# Patient Record
Sex: FEMALE | Race: WHITE | NOT HISPANIC OR LATINO | Employment: FULL TIME | URBAN - NONMETROPOLITAN AREA
[De-identification: names, ages, dates, MRNs, and addresses within clinical notes are randomized per-mention and may not be internally consistent; named-entity substitution may affect disease eponyms.]

---

## 2018-01-17 ENCOUNTER — TRANSCRIBE ORDERS (OUTPATIENT)
Dept: ADMINISTRATIVE | Facility: HOSPITAL | Age: 38
End: 2018-01-17

## 2018-01-17 DIAGNOSIS — R55 SYNCOPE AND COLLAPSE: Primary | ICD-10-CM

## 2018-01-23 ENCOUNTER — HOSPITAL ENCOUNTER (OUTPATIENT)
Dept: MRI IMAGING | Facility: HOSPITAL | Age: 38
Discharge: HOME | End: 2018-01-23
Admitting: NURSE PRACTITIONER

## 2018-01-23 ENCOUNTER — TRANSCRIBE ORDERS (OUTPATIENT)
Dept: ADMINISTRATIVE | Facility: HOSPITAL | Age: 38
End: 2018-01-23

## 2018-01-23 DIAGNOSIS — R55 SYNCOPE AND COLLAPSE: Primary | ICD-10-CM

## 2018-01-23 DIAGNOSIS — R55 SYNCOPE AND COLLAPSE: ICD-10-CM

## 2018-01-23 PROCEDURE — 70553 MRI BRAIN STEM W/O & W/DYE: CPT | Performed by: RADIOLOGY

## 2018-01-23 PROCEDURE — 0 GADOBENATE DIMEGLUMINE 529 MG/ML SOLUTION: Performed by: NURSE PRACTITIONER

## 2018-01-23 PROCEDURE — 70553 MRI BRAIN STEM W/O & W/DYE: CPT

## 2018-01-23 PROCEDURE — A9577 INJ MULTIHANCE: HCPCS | Performed by: NURSE PRACTITIONER

## 2018-01-23 RX ADMIN — GADOBENATE DIMEGLUMINE 12 ML: 529 INJECTION, SOLUTION INTRAVENOUS at 08:50

## 2023-04-11 ENCOUNTER — HOSPITAL ENCOUNTER (OUTPATIENT)
Dept: MAMMOGRAPHY | Facility: HOSPITAL | Age: 43
Discharge: HOME | End: 2023-04-11
Payer: COMMERCIAL

## 2023-04-11 ENCOUNTER — HOSPITAL ENCOUNTER (OUTPATIENT)
Dept: ULTRASOUND IMAGING | Facility: HOSPITAL | Age: 43
Discharge: HOME | End: 2023-04-11
Payer: COMMERCIAL

## 2023-04-11 DIAGNOSIS — R92.8 ABNORMAL MAMMOGRAM: ICD-10-CM

## 2023-04-11 PROCEDURE — 77065 DX MAMMO INCL CAD UNI: CPT

## 2023-04-11 PROCEDURE — 76642 ULTRASOUND BREAST LIMITED: CPT

## 2023-04-11 PROCEDURE — 77061 BREAST TOMOSYNTHESIS UNI: CPT | Performed by: RADIOLOGY

## 2023-04-11 PROCEDURE — G0279 TOMOSYNTHESIS, MAMMO: HCPCS

## 2023-04-11 PROCEDURE — 76642 ULTRASOUND BREAST LIMITED: CPT | Performed by: RADIOLOGY

## 2023-04-11 PROCEDURE — 77065 DX MAMMO INCL CAD UNI: CPT | Performed by: RADIOLOGY

## 2025-04-14 ENCOUNTER — OFFICE VISIT (OUTPATIENT)
Dept: ORTHOPEDIC SURGERY | Facility: CLINIC | Age: 45
End: 2025-04-14
Payer: COMMERCIAL

## 2025-04-14 VITALS
HEIGHT: 65 IN | SYSTOLIC BLOOD PRESSURE: 118 MMHG | BODY MASS INDEX: 24.26 KG/M2 | HEART RATE: 82 BPM | DIASTOLIC BLOOD PRESSURE: 72 MMHG | OXYGEN SATURATION: 96 % | WEIGHT: 145.6 LBS

## 2025-04-14 DIAGNOSIS — M79.642 BILATERAL HAND PAIN: Primary | ICD-10-CM

## 2025-04-14 DIAGNOSIS — G56.03 CARPAL TUNNEL SYNDROME ON BOTH SIDES: ICD-10-CM

## 2025-04-14 DIAGNOSIS — M79.10 MUSCLE PAIN: ICD-10-CM

## 2025-04-14 DIAGNOSIS — M54.2 NECK PAIN: ICD-10-CM

## 2025-04-14 DIAGNOSIS — M62.838 MUSCLE SPASM: ICD-10-CM

## 2025-04-14 DIAGNOSIS — R20.2 PARESTHESIA OF HAND, BILATERAL: ICD-10-CM

## 2025-04-14 DIAGNOSIS — M79.641 BILATERAL HAND PAIN: Primary | ICD-10-CM

## 2025-04-14 PROCEDURE — 1160F RVW MEDS BY RX/DR IN RCRD: CPT | Performed by: FAMILY MEDICINE

## 2025-04-14 PROCEDURE — 1159F MED LIST DOCD IN RCRD: CPT | Performed by: FAMILY MEDICINE

## 2025-04-14 PROCEDURE — 99203 OFFICE O/P NEW LOW 30 MIN: CPT | Performed by: FAMILY MEDICINE

## 2025-04-14 RX ORDER — LEVOCETIRIZINE DIHYDROCHLORIDE 5 MG/1
5 TABLET, FILM COATED ORAL DAILY
COMMUNITY
Start: 2025-03-26

## 2025-04-14 RX ORDER — TIZANIDINE 2 MG/1
2 TABLET ORAL
COMMUNITY
Start: 2025-03-26

## 2025-04-14 RX ORDER — PANTOPRAZOLE SODIUM 40 MG/1
40 TABLET, DELAYED RELEASE ORAL DAILY
COMMUNITY
Start: 2025-03-26

## 2025-04-14 RX ORDER — MONTELUKAST SODIUM 10 MG/1
10 TABLET ORAL DAILY
COMMUNITY
Start: 2025-03-26

## 2025-04-14 RX ORDER — ROPINIROLE 0.5 MG/1
0.5 TABLET, FILM COATED ORAL DAILY
COMMUNITY
Start: 2025-03-26

## 2025-04-14 RX ORDER — NAPROXEN 500 MG/1
500 TABLET ORAL
COMMUNITY
Start: 2025-03-26

## 2025-04-14 RX ORDER — BUSPIRONE HYDROCHLORIDE 7.5 MG/1
7.5 TABLET ORAL
COMMUNITY
Start: 2025-03-26

## 2025-04-14 NOTE — PROGRESS NOTES
New Patient Visit      Patient: Vidya Ryder  YOB: 1980  Date of Encounter: 04/14/2025  PCP: Дмитрий Pineda DO  Referring Provider: Дмитрий Pineda DO     Subjective   Vidya Ryder is a 44 y.o. female who presents to the office today for evaluation of Initial Evaluation, Pain, and Numbness of the Right Hand and Numbness, Pain, and Initial Evaluation of the Left Hand      Chief Complaint   Patient presents with    Right Hand - Initial Evaluation, Pain, Numbness    Left Hand - Numbness, Pain, Initial Evaluation       History of Present Illness  The patient presents for evaluation of bilateral hand pain.    She reports experiencing numbness and tingling in both hands, with a specific mention of discomfort at the base of her left thumb when pressure is applied. This condition has been persisting for several months and appears to be progressively worsening. She reports no injury or change in activity level that could have precipitated this condition. The symptoms are not localized to a specific part of the hand but rather originate from the shoulders and radiate down the arms. The pain is intermittent, with no identifiable triggers, and is often accompanied by numbness and tingling during sleep, mopping, walking, gripping objects, and driving. She occasionally wakes up with numbness in her hands. Her sleep position is typically on her sides. She also reports swelling in her hands. She is left-handed. She has not sought any treatment for this condition, except for taking naproxen during severe pain episodes, which provides some relief.    Additionally, she reports recent neck pain, which does not radiate down the arm but causes discomfort in the back of her neck. She does not experience any shoulder pain.    FAMILY HISTORY  Her father had carpal tunnel syndrome and underwent surgery.    MEDICATIONS  Naproxen    There is no problem list on file for this patient.      Past Medical History:   Diagnosis  "Date    Ankle sprain        Past Surgical History:   Procedure Laterality Date     SECTION      ENDOMETRIAL ABLATION         Family History   Problem Relation Age of Onset    Cancer Mother         Scirrhous liver    Diabetes Mother     Diabetes Father     Cancer Maternal Grandmother         Lung    Diabetes Paternal Grandmother     Diabetes Sister     Breast cancer Neg Hx        Social History     Socioeconomic History    Marital status:    Tobacco Use    Smoking status: Every Day     Current packs/day: 1.00     Average packs/day: 1 pack/day for 18.9 years (18.9 ttl pk-yrs)     Types: Cigarettes     Start date: 2006    Smokeless tobacco: Never   Vaping Use    Vaping status: Never Used   Substance and Sexual Activity    Alcohol use: Not Currently     Comment: Occasionally drink a couple times a year    Drug use: Never    Sexual activity: Yes     Partners: Male       Current Outpatient Medications   Medication Sig Dispense Refill    busPIRone (BUSPAR) 7.5 MG tablet Take 1 tablet by mouth.      cholecalciferol (VITAMIN D3) 1.25 MG (15229 UT) capsule Take 1 capsule by mouth Every 7 (Seven) Days.      levocetirizine (XYZAL) 5 MG tablet Take 1 tablet by mouth Daily.      montelukast (SINGULAIR) 10 MG tablet Take 1 tablet by mouth Daily.      naproxen (NAPROSYN) 500 MG tablet Take 1 tablet by mouth.      pantoprazole (PROTONIX) 40 MG EC tablet Take 1 tablet by mouth Daily.      rOPINIRole (REQUIP) 0.5 MG tablet Take 1 tablet by mouth Daily.      tiZANidine (ZANAFLEX) 2 MG tablet Take 1 tablet by mouth.       No current facility-administered medications for this visit.       No Known Allergies    Vitals:   /72 (BP Location: Right arm, Patient Position: Sitting, Cuff Size: Adult)   Pulse 82   Ht 165.1 cm (65\")   Wt 66 kg (145 lb 9.6 oz)   SpO2 96%   BMI 24.23 kg/m²   BMI is within normal parameters. No other follow-up for BMI required.       Visit Vitals  /72 (BP Location: Right arm, " "Patient Position: Sitting, Cuff Size: Adult)   Pulse 82   Ht 165.1 cm (65\")   Wt 66 kg (145 lb 9.6 oz)   SpO2 96%   BMI 24.23 kg/m²     44 y.o.female     Review of Systems   Constitutional:  Positive for activity change. Negative for fever.   Respiratory:  Negative for shortness of breath and wheezing.    Cardiovascular:  Negative for chest pain.   Musculoskeletal:  Positive for arthralgias, myalgias, neck pain and neck stiffness.   Skin:  Negative for color change and wound.   Neurological:  Positive for weakness and numbness.       Physical Exam  Vitals and nursing note reviewed.   Constitutional:       General: She is not in acute distress.     Appearance: Normal appearance.   Pulmonary:      Effort: Pulmonary effort is normal. No respiratory distress.   Skin:     General: Skin is warm and dry.      Findings: No erythema.   Neurological:      General: No focal deficit present.      Mental Status: She is alert.      Sensory: No sensory deficit.      Motor: No weakness.       Physical Exam  Bilateral upper extremities show a 1+ radial pulse, intact pinch, , and intrinsic strength. Full wrist range of motion is observed. Negative Tinel's sign is noted bilaterally. The cervical spine exhibits local tenderness and restricted range of motion, particularly with right rotation and side bending. Local pain is present on Spurling's test but does not recreate hand pain and paresthesias.    Radiology Results:      XR Spine Cervical 2 or 3 View  Order: [id number]  Impression    No acute process.    Degenerative change.    Images reviewed, interpreted, and dictated by Dr. Joe Slaughter.  Transcribed by Carol Ann Perez PA-C.  Narrative    CERVICAL SPINE SERIES.    HISTORY: Chronic neck pain.    FINDINGS: 5 views of the cervical spine were obtained. Patient is  edentulous. The prevertebral soft tissues are normal. There is no  subluxation or fracture.  There is mild anterior osteophyte formation at  C5-C6.  The facet joints " appear well aligned.  Exam End: 03/31/25 08:41     Results  Imaging  X-ray of the neck shows mild arthritis.     Diagnoses and all orders for this visit:    1. Bilateral hand pain (Primary)    2. Paresthesia of hand, bilateral    3. Carpal tunnel syndrome on both sides    4. Neck pain    5. Muscle pain    6. Muscle spasm        Assessment & Plan  1. Bilateral hand pain.  Symptoms are suggestive of carpal tunnel syndrome, with numbness and tingling in both hands, more pronounced on the left side. The pain radiates from the shoulders down the arms, involving all fingers so I cannot rule out higher nerve involvement at this time. There is no history of injury or changes in activity.  A trial of treatment for carpal tunnel syndrome will be initiated. She is advised to use wrist braces during sleep and as needed during the day to prevent flare-ups.  These are provided in office.  A home exercise program will be provided. Regular use of naproxen for a couple of weeks is recommended, along with Tylenol if necessary. Additional measures include icing the wrist, soaking hands in warm water with dissolved Epsom salts, and maintaining good form during activities. If symptoms do not improve, further evaluation will be considered.    2. Neck pain.  The patient reports recent neck pain, which does not radiate down the arm but causes discomfort in the back of her neck. X-ray of the neck shows mild arthritis.  Patient was given home exercise program for the neck as well    Follow-up  The patient will follow up in 1 month.        Discussion:    MEDS ORDERED DURING VISIT:  No orders of the defined types were placed in this encounter.    MEDICATION ISSUES:  Discussed medication options and treatment plan of prescribed medication as well as the risks, benefits, and side effects including potential falls, possible impaired driving and metabolic adversities among others. Patient is agreeable to call the office with any worsening of  symptoms or onset of side effects. Patient is agreeable to call 911 or go to the nearest ER should he/she begin having SI/HI.         This document has been electronically signed by Pedro Salvador DO   April 14, 2025 15:11 EDT    Patient or patient representative verbalized consent for the use of Ambient Listening during the visit with  Pedro Salvador DO for chart documentation. 4/14/2025  16:27 EDT

## 2025-04-16 ENCOUNTER — PATIENT ROUNDING (BHMG ONLY) (OUTPATIENT)
Dept: ORTHOPEDIC SURGERY | Facility: CLINIC | Age: 45
End: 2025-04-16
Payer: COMMERCIAL

## 2025-04-16 NOTE — PROGRESS NOTES
April 16, 2025    Hello, may I speak with Vidya Ryder?    My name is CIELO    I am  with [facility]  [address]  [facility] [address] [phone].    Before we get started may I verify your date of birth? 1980    I am calling to officially welcome you to our practice and ask about your recent visit. Is this a good time to talk? yes    Tell me about your visit with us. What things went well?  EVERYTHING WENT WELL. NO COMPLAINTS.       We're always looking for ways to make our patients' experiences even better. Do you have recommendations on ways we may improve?  no    Overall were you satisfied with your first visit to our practice? yes       I appreciate you taking the time to speak with me today. Is there anything else I can do for you? no      Thank you, and have a great day.